# Patient Record
Sex: MALE | Race: WHITE | Employment: FULL TIME | URBAN - METROPOLITAN AREA
[De-identification: names, ages, dates, MRNs, and addresses within clinical notes are randomized per-mention and may not be internally consistent; named-entity substitution may affect disease eponyms.]

---

## 2021-11-24 DIAGNOSIS — Z82.49 FAMILY HISTORY OF ISCHEMIC HEART DISEASE: Primary | ICD-10-CM

## 2021-11-24 DIAGNOSIS — Z82.41 FAMILY HISTORY OF SUDDEN CARDIAC DEATH: ICD-10-CM

## 2021-11-24 DIAGNOSIS — Z82.49 FAMILY HISTORY OF MITRAL VALVE PROLAPSE: ICD-10-CM

## 2021-12-14 ENCOUNTER — TELEPHONE (OUTPATIENT)
Dept: OTOLARYNGOLOGY | Facility: CLINIC | Age: 41
End: 2021-12-14
Payer: COMMERCIAL
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
## 2021-12-16 ENCOUNTER — HOSPITAL ENCOUNTER (OUTPATIENT)
Dept: CARDIOLOGY | Facility: CLINIC | Age: 41
Discharge: HOME | End: 2021-12-16
Attending: FAMILY MEDICINE | Admitting: FAMILY MEDICINE
Payer: COMMERCIAL

## 2021-12-16 DIAGNOSIS — Z82.49 FAMILY HISTORY OF MITRAL VALVE PROLAPSE: ICD-10-CM

## 2021-12-16 DIAGNOSIS — Z82.41 FAMILY HISTORY OF SUDDEN CARDIAC DEATH: ICD-10-CM

## 2021-12-16 DIAGNOSIS — Z82.49 FAMILY HISTORY OF ISCHEMIC HEART DISEASE: ICD-10-CM

## 2021-12-16 LAB — LVEF ECHO: NORMAL

## 2021-12-16 PROCEDURE — 93306 TTE W/DOPPLER COMPLETE: CPT

## 2021-12-16 PROCEDURE — 93306 TTE W/DOPPLER COMPLETE: CPT | Mod: 26 | Performed by: INTERNAL MEDICINE

## 2022-01-19 ENCOUNTER — OFFICE VISIT (OUTPATIENT)
Dept: OTOLARYNGOLOGY | Facility: CLINIC | Age: 42
End: 2022-01-19
Payer: COMMERCIAL

## 2022-01-19 ENCOUNTER — PRE VISIT (OUTPATIENT)
Dept: OTOLARYNGOLOGY | Facility: CLINIC | Age: 42
End: 2022-01-19

## 2022-01-19 VITALS — OXYGEN SATURATION: 99 % | HEART RATE: 65 BPM | WEIGHT: 192 LBS | HEIGHT: 75 IN | BODY MASS INDEX: 23.87 KG/M2

## 2022-01-19 DIAGNOSIS — S00.551A FOREIGN BODY IN LIP, INITIAL ENCOUNTER: Primary | ICD-10-CM

## 2022-01-19 PROCEDURE — 99202 OFFICE O/P NEW SF 15 MIN: CPT | Mod: 25 | Performed by: OTOLARYNGOLOGY

## 2022-01-19 PROCEDURE — 10120 INC&RMVL FB SUBQ TISS SMPL: CPT | Performed by: OTOLARYNGOLOGY

## 2022-01-19 ASSESSMENT — PAIN SCALES - GENERAL: PAINLEVEL: NO PAIN (0)

## 2022-01-19 ASSESSMENT — MIFFLIN-ST. JEOR: SCORE: 1856.54
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
## 2022-01-19 NOTE — NURSING NOTE
Consent obtained for Removal of Left Upper Lip Foreign Body.    Pt encouraged to keep a sheen of Aquaphor on lips.    F/u with Dr. Maria Eugenia perez.    Lisa Morales RN  1/19/2022 10:57 AM

## 2022-01-19 NOTE — NURSING NOTE
"Chief Complaint   Patient presents with     Consult     New patient       Pulse 65, height 1.905 m (6' 3\"), weight 87.1 kg (192 lb), SpO2 99 %.    Harvinder Urbina, EMT  "

## 2022-03-19 ENCOUNTER — HEALTH MAINTENANCE LETTER (OUTPATIENT)
Age: 42
End: 2022-03-19

## 2022-09-03 ENCOUNTER — HEALTH MAINTENANCE LETTER (OUTPATIENT)
Age: 42
End: 2022-09-03

## 2023-04-29 ENCOUNTER — HEALTH MAINTENANCE LETTER (OUTPATIENT)
Age: 43
End: 2023-04-29

## 2025-06-01 ENCOUNTER — HEALTH MAINTENANCE LETTER (OUTPATIENT)
Age: 45
End: 2025-06-01

## (undated) RX ORDER — LIDOCAINE HYDROCHLORIDE AND EPINEPHRINE 10; 10 MG/ML; UG/ML
INJECTION, SOLUTION INFILTRATION; PERINEURAL
Status: DISPENSED
Start: 2022-01-19